# Patient Record
Sex: FEMALE | Race: BLACK OR AFRICAN AMERICAN | URBAN - METROPOLITAN AREA
[De-identification: names, ages, dates, MRNs, and addresses within clinical notes are randomized per-mention and may not be internally consistent; named-entity substitution may affect disease eponyms.]

---

## 2017-07-05 ENCOUNTER — OFFICE VISIT (OUTPATIENT)
Dept: URGENT CARE | Facility: URGENT CARE | Age: 22
End: 2017-07-05
Payer: COMMERCIAL

## 2017-07-05 VITALS
WEIGHT: 189 LBS | TEMPERATURE: 97.4 F | HEART RATE: 60 BPM | DIASTOLIC BLOOD PRESSURE: 76 MMHG | OXYGEN SATURATION: 100 % | SYSTOLIC BLOOD PRESSURE: 110 MMHG

## 2017-07-05 DIAGNOSIS — L03.211 CELLULITIS OF FACE: Primary | ICD-10-CM

## 2017-07-05 PROCEDURE — 99203 OFFICE O/P NEW LOW 30 MIN: CPT | Performed by: NURSE PRACTITIONER

## 2017-07-05 RX ORDER — CEPHALEXIN 500 MG/1
500 CAPSULE ORAL 4 TIMES DAILY
Qty: 40 CAPSULE | Refills: 0 | Status: SHIPPED | OUTPATIENT
Start: 2017-07-05 | End: 2017-07-15

## 2017-07-05 NOTE — LETTER
[facility]  [address]  [facility] [address]  Phone: [phone]    July 5, 2017        Fabiola Tubbs  [address] [facility] [address]          To whom it may concern:    RE: Fabiola Tubbs    Patient was seen and treated today at our clinic for a facial bacterial infection.  Patient may return to work 7/7/2017 with the following no restrictions    Please contact me for questions or concerns.      Sincerely,        Joy Ivy NP

## 2017-07-05 NOTE — PROGRESS NOTES
SUBJECTIVE:                                                    Fabiola Tubbs is a 22 year old female who presents to clinic today for the following health issues:    FACIAL SWELLING      Duration: yesterday had pain and bump near area. Swelling started this morning.     Description (location/character/radiation): right sided facial swelling    Intensity:  mild    Accompanying signs and symptoms:     History (similar episodes/previous evaluation): None    Precipitating or alleviating factors: None    Therapies tried and outcome: hydrogen peroxide- made sx worse.           No Known Allergies    No past medical history on file.      No current outpatient prescriptions on file prior to visit.  No current facility-administered medications on file prior to visit.     Social History   Substance Use Topics     Smoking status: Former Smoker     Smokeless tobacco: Not on file     Alcohol use Not on file       ROS:  General: negative for fever  SKIN: + as above  Physcial Exam:  /76 (BP Location: Left arm, Patient Position: Chair, Cuff Size: Adult Regular)  Pulse 60  Temp 97.4  F (36.3  C) (Oral)  Wt 189 lb (85.7 kg)  SpO2 100%  Breastfeeding? No    GENERAL: alert, no acute distress  EYES: conjunctival clear  RESP: Regular breathing rate  NEURO: awake .  SKIN: Tenderness, erythema and swelling of right cheek, small dotted opening present and draining clear liquid. Skin on the right cheek feels warm to touch.     ASSESSMENT:    ICD-10-CM    1. Cellulitis of face L03.211 cephALEXin (KEFLEX) 500 MG capsule       PLAN:  Bacitracin ointment, advised not to squeeze the swelling, wash hands after touching the wound opening.  See today's orders.  Follow-up with primary clinic if not improving.  Advised about symptoms which might herald more serious problems.    Joy Ivy  [facility]  Family Nurse Practitoner

## 2017-07-05 NOTE — MR AVS SNAPSHOT
After Visit Summary   7/5/2017    Fabiola Tubbs    MRN: [id number]           Patient Information     Date Of Birth          1995        Visit Information        Provider Department      7/5/2017 6:30 PM Joy Ivy NP [facility]        Today's Diagnoses     Cellulitis of face    -  1      Care Instructions      Facial Cellulitis  Cellulitis is an infection of the deep layers of skin. A break in the skin, such as a cut or scratch, can let bacteria under the skin. It may also occur from an infected oil gland (pimple) or hair follicle. If the bacteria get to deep layers of the skin, it can be serious. If not treated, cellulitis can get into the bloodstream and lymph nodes. The infection can then spread throughout the body. This causes serious illness.  Cellulitis causes the affected skin to become red, swollen, warm, and sore. The reddened areas have a visible border. You may have a fever, chills, and pain.  Cellulitis is treated with antibiotics taken for 7 to 10 days. Symptoms should get better 1 to 2 days after treatment is started. Make sure to take all the antibiotics for the full number of days until they are gone. Keep taking the medication even if your symptoms go away.  Home care  Follow these tips:    Take all of the antibiotic medicine exactly as directed until it is gone. Don t miss any doses, especially during the first 7 days. Don t stop taking it when your symptoms get better.    Use a cool compress (face cloth soaked in cool water) on your face to help reduce swelling and pain.    You may use acetaminophen or ibuprofen to reduce pain. Don t use these if you have chronic liver or kidney disease, or ever had a stomach ulcer or gastrointestinal bleeding. Talk with your healthcare provider first.  Follow-up care  Follow up with your healthcare provider, or as advised. If your infection does not go away on the first antibiotic, your healthcare provider will prescribe a  "different one.  When to seek medical advice  Call your healthcare provider right away if any of these occur:    Fever higher of 100.4  F (38.0  C) or higher after 2 days on antibiotics    Red areas that spread    Swelling or pain that gets worse    Fluid leaking from the skin (pus)    An eyelid that swells shut or leaks fluid (pus)    Headache or neck pain that gets worse    Unusual drowsiness or confusion    Convulsions (seizure)    Change in eyesight     Date Last Reviewed: 9/1/2016 2000-2017 [employer]. [address] [place] [address]. All rights reserved. This information is not intended as a substitute for professional medical care. Always follow your healthcare professional's instructions.                Follow-ups after your visit        Who to contact     If you have questions or need follow up information about today's clinic visit or your schedule please contact [facility] directly at [phone].  Normal or non-critical lab and imaging results will be communicated to you by Mist.iohart, letter or phone within 4 business days after the clinic has received the results. If you do not hear from us within 7 days, please contact the clinic through Mist.iohart or phone. If you have a critical or abnormal lab result, we will notify you by phone as soon as possible.  Submit refill requests through Globitel or call your pharmacy and they will forward the refill request to us. Please allow 3 business days for your refill to be completed.          Additional Information About Your Visit        Globitel Information     Globitel lets you send messages to your doctor, view your test results, renew your prescriptions, schedule appointments and more. To sign up, go to [email or web address] . Click on \"Log in\" on the left side of the screen, which will take you to the Welcome page. Then click on \"Sign up Now\" on the right side of the page.     You will be asked to enter the access " code listed below, as well as some personal information. Please follow the directions to create your username and password.     Your access code is: [email or web address]  Expires: 10/3/2017  7:20 PM     Your access code will  in 90 days. If you need help or a new code, please call your [facility] or [phone].        Care EveryWhere ID     This is your Care EveryWhere ID. This could be used by other organizations to access your [facility] medical records  [id number]        Your Vitals Were     Pulse Temperature Pulse Oximetry Breastfeeding?          60 97.4  F (36.3  C) (Oral) 100% No         Blood Pressure from Last 3 Encounters:   17 110/76    Weight from Last 3 Encounters:   17 189 lb (85.7 kg)              Today, you had the following     No orders found for display         Today's Medication Changes          These changes are accurate as of: 17  7:20 PM.  If you have any questions, ask your nurse or doctor.               Start taking these medicines.        Dose/Directions    cephALEXin 500 MG capsule   Commonly known as:  KEFLEX   Used for:  Cellulitis of face   Started by:  Joy Ivy NP        Dose:  500 mg   Take 1 capsule (500 mg) by mouth 4 times daily for 10 days   Quantity:  40 capsule   Refills:  0            Where to get your medicines      These medications were sent to [facility] [address] [place] [address]     Phone:  [phone]     cephALEXin 500 MG capsule                Primary Care Provider    None Specified       No primary provider on file.        Equal Access to Services     [facility] AH: Hadii aad ku hadasho Soradha, waaxda luqadaha, qaybta kaalmada adechikayada, sri wen. So [facility] [phone].    ATENCIÓN: Si habla español, tiene a meyer disposición servicios gratuitos de asistencia lingüística. Llame al [phone].    We comply with applicable federal civil  rights laws and [place] laws. We do not discriminate on the basis of race, color, national origin, age, disability sex, sexual orientation or gender identity.            Thank you!     Thank you for choosing [facility]  for your care. Our goal is always to provide you with excellent care. Hearing back from our patients is one way we can continue to improve our services. Please take a few minutes to complete the written survey that you may receive in the mail after your visit with us. Thank you!             Your Updated Medication List - Protect others around you: Learn how to safely use, store and throw away your medicines at www.disposemymeds.org.          This list is accurate as of: 7/5/17  7:20 PM.  Always use your most recent med list.                   Brand Name Dispense Instructions for use Diagnosis    cephALEXin 500 MG capsule    KEFLEX    40 capsule    Take 1 capsule (500 mg) by mouth 4 times daily for 10 days    Cellulitis of face

## 2017-07-06 NOTE — NURSING NOTE
Chief Complaint   Patient presents with     Facial Swelling     Pt c/o right sided facial swelling.        Initial /76 (BP Location: Left arm, Patient Position: Chair, Cuff Size: Adult Regular)  Pulse 60  Temp 97.4  F (36.3  C) (Oral)  Wt 189 lb (85.7 kg)  SpO2 100%  Breastfeeding? No There is no height or weight on file to calculate BMI.  Medication Reconciliation: complete       Sherry Hinson CMA (AAMA)

## 2017-07-06 NOTE — PATIENT INSTRUCTIONS
Facial Cellulitis  Cellulitis is an infection of the deep layers of skin. A break in the skin, such as a cut or scratch, can let bacteria under the skin. It may also occur from an infected oil gland (pimple) or hair follicle. If the bacteria get to deep layers of the skin, it can be serious. If not treated, cellulitis can get into the bloodstream and lymph nodes. The infection can then spread throughout the body. This causes serious illness.  Cellulitis causes the affected skin to become red, swollen, warm, and sore. The reddened areas have a visible border. You may have a fever, chills, and pain.  Cellulitis is treated with antibiotics taken for 7 to 10 days. Symptoms should get better 1 to 2 days after treatment is started. Make sure to take all the antibiotics for the full number of days until they are gone. Keep taking the medication even if your symptoms go away.  Home care  Follow these tips:    Take all of the antibiotic medicine exactly as directed until it is gone. Don t miss any doses, especially during the first 7 days. Don t stop taking it when your symptoms get better.    Use a cool compress (face cloth soaked in cool water) on your face to help reduce swelling and pain.    You may use acetaminophen or ibuprofen to reduce pain. Don t use these if you have chronic liver or kidney disease, or ever had a stomach ulcer or gastrointestinal bleeding. Talk with your healthcare provider first.  Follow-up care  Follow up with your healthcare provider, or as advised. If your infection does not go away on the first antibiotic, your healthcare provider will prescribe a different one.  When to seek medical advice  Call your healthcare provider right away if any of these occur:    Fever higher of 100.4  F (38.0  C) or higher after 2 days on antibiotics    Red areas that spread    Swelling or pain that gets worse    Fluid leaking from the skin (pus)    An eyelid that swells shut or leaks fluid (pus)    Headache or  neck pain that gets worse    Unusual drowsiness or confusion    Convulsions (seizure)    Change in eyesight     Date Last Reviewed: 9/1/2016 2000-2017 [employer] [address] [place] [address], [place] [address]. All rights reserved. This information is not intended as a substitute for professional medical care. Always follow your healthcare professional's instructions.

## 2017-08-09 ENCOUNTER — OFFICE VISIT (OUTPATIENT)
Dept: URGENT CARE | Facility: URGENT CARE | Age: 22
End: 2017-08-09
Payer: COMMERCIAL

## 2017-08-09 VITALS
TEMPERATURE: 97.3 F | SYSTOLIC BLOOD PRESSURE: 112 MMHG | WEIGHT: 191 LBS | OXYGEN SATURATION: 97 % | HEART RATE: 84 BPM | DIASTOLIC BLOOD PRESSURE: 77 MMHG

## 2017-08-09 DIAGNOSIS — H60.331 ACUTE SWIMMER'S EAR OF RIGHT SIDE: Primary | ICD-10-CM

## 2017-08-09 PROCEDURE — 99213 OFFICE O/P EST LOW 20 MIN: CPT | Performed by: NURSE PRACTITIONER

## 2017-08-09 RX ORDER — CIPROFLOXACIN AND DEXAMETHASONE 3; 1 MG/ML; MG/ML
4 SUSPENSION/ DROPS AURICULAR (OTIC) 2 TIMES DAILY
Qty: 2.8 ML | Refills: 0 | Status: SHIPPED | OUTPATIENT
Start: 2017-08-09 | End: 2017-08-16

## 2017-08-09 NOTE — MR AVS SNAPSHOT
After Visit Summary   8/9/2017    Fabiola Tubbs    MRN: [id number]           Patient Information     Date Of Birth          1995        Visit Information        Provider Department      8/9/2017 4:10 PM Joy Ivy NP [facility]        Today's Diagnoses     Acute swimmer's ear of right side    -  1      Care Instructions      External Ear Infection (Adult)    External otitis (also called  swimmer s ear ) is an infection in the ear canal. It is often caused by bacteria or fungus. It can occur a few days after water gets trapped in the ear canal (from swimming or bathing). It can also occur after cleaning too deeply in the ear canal with a cotton swab or other object. Sometimes, hair care products get into the ear canal and cause this problem.  Symptoms can include pain, fever, itching, redness, drainage, or swelling of the ear canal. Temporary hearing loss may also occur.  Home care    Do not try to clean the ear canal. This can push pus and bacteria deeper into the canal.    Use prescribed ear drops as directed. These help reduce swelling and fight the infection. If an ear wick was placed in the ear canal, apply drops right onto the end of the wick. The wick will draw the medication into the ear canal even if it is swollen closed.    A cotton ball may be loosely placed in the outer ear to absorb any drainage.    You may use acetaminophen or ibuprofen to control pain, unless another medication was prescribed. Note: If you have chronic liver or kidney disease or ever had a stomach ulcer or GI bleeding, talk to your health care provider before taking any of these medications.    Do not allow water to get into your ear when bathing. Also, avoid swimming until the infection has cleared.  Prevention    Keep your ears dry. This helps lower the risk of infection. Dry your ears with a towel or hair dryer after getting wet. Also, use ear plugs when swimming.    Do not stick any objects in  the ear to remove wax.    If you feel water trapped in your ear, use ear drops right away. You can get these drops over the counter at most drugstores. They work by removing water from the ear canal.  Follow-up care  Follow up with your health care provider in one week, or as advised.  When to seek medical advice  Call your health care provider right away if any of these occur:    Ear pain becomes worse or doesn t improve after 3 days of treatment    Redness or swelling of the outer ear occurs or gets worse    Headache    Painful or stiff neck    Drowsiness or confusion    Fever of 100.4 F (38 C) or higher, or as directed by your health care provider    Seizure  Date Last Reviewed: 3/22/2015    [id number] [employer]. [address] [place] [address], [place] [address]. All rights reserved. This information is not intended as a substitute for professional medical care. Always follow your healthcare professional's instructions.                Follow-ups after your visit        Who to contact     If you have questions or need follow up information about today's clinic visit or your schedule please contact [facility] directly at [phone].  Normal or non-critical lab and imaging results will be communicated to you by MyChart, letter or phone within 4 business days after the clinic has received the results. If you do not hear from us within 7 days, please contact the clinic through Storybirdhart or phone. If you have a critical or abnormal lab result, we will notify you by phone as soon as possible.  Submit refill requests through [employer] or call your pharmacy and they will forward the refill request to us. Please allow 3 business days for your refill to be completed.          Additional Information About Your Visit        StorybirdharLevels Beyond Information     [employer] lets you send messages to your doctor, view your test results, renew your prescriptions, schedule appointments and more. To sign up, go to  [email or web address] . Click on \"Log in\" on the left side of the screen, which will take you to the Welcome page. Then click on \"Sign up Now\" on the right side of the page.     You will be asked to enter the access code listed below, as well as some personal information. Please follow the directions to create your username and password.     Your access code is: [email or web address]  Expires: 10/3/2017  7:20 PM     Your access code will  in 90 days. If you need help or a new code, please call your [facility] clinic or [phone].        Care EveryWhere ID     This is your Care EveryWhere ID. This could be used by other organizations to access your [facility] medical records  [id number]        Your Vitals Were     Pulse Temperature Pulse Oximetry             84 97.3  F (36.3  C) (Oral) 97%          Blood Pressure from Last 3 Encounters:   17 112/77   17 110/76    Weight from Last 3 Encounters:   17 191 lb (86.6 kg)   17 189 lb (85.7 kg)              Today, you had the following     No orders found for display         Today's Medication Changes          These changes are accurate as of: 17  5:22 PM.  If you have any questions, ask your nurse or doctor.               Start taking these medicines.        Dose/Directions    ciprofloxacin-dexamethasone otic suspension   Commonly known as:  CIPRODEX   Used for:  Acute swimmer's ear of right side   Started by:  Joy Ivy NP        Dose:  4 drop   Place 4 drops into the right ear 2 times daily for 7 days   Quantity:  2.8 mL   Refills:  0            Where to get your medicines      These medications were sent to [facility] [address] [place] [address]     Phone:  [phone]     ciprofloxacin-dexamethasone otic suspension                Primary Care Provider    None Specified       No primary provider on file.        Equal Access to Services     [facility] AH: Kenneth dalton " daniela Cisse, ivy luheribertoadaha, qabryonta kaveronica gennaanita, sri brandiin hayaaazul vailchika shreyasjonsusan mendozaWillianmicah bettye. So [facility] [phone].    ATENCIÓN: Si habla español, tiene a meyer disposición servicios gratuitos de asistencia lingüística. Natalya al [phone].    We comply with applicable federal civil rights laws and [place] laws. We do not discriminate on the basis of race, color, national origin, age, disability sex, sexual orientation or gender identity.            Thank you!     Thank you for choosing [facility]  for your care. Our goal is always to provide you with excellent care. Hearing back from our patients is one way we can continue to improve our services. Please take a few minutes to complete the written survey that you may receive in the mail after your visit with us. Thank you!             Your Updated Medication List - Protect others around you: Learn how to safely use, store and throw away your medicines at www.disposemymeds.org.          This list is accurate as of: 8/9/17  5:22 PM.  Always use your most recent med list.                   Brand Name Dispense Instructions for use Diagnosis    ciprofloxacin-dexamethasone otic suspension    CIPRODEX    2.8 mL    Place 4 drops into the right ear 2 times daily for 7 days    Acute swimmer's ear of right side

## 2017-08-09 NOTE — PROGRESS NOTES
SUBJECTIVE:                                                    Fabiola Tubbs is a 22 year old female who presents to clinic today for the following health issues:      Ear Pain      Duration: 2 days    Description (location/character/radiation): right ear    Intensity:  moderate    Accompanying signs and symptoms: ear pain    History (similar episodes/previous evaluation): None    Precipitating or alleviating factors: None    Therapies tried and outcome: None        No Known Allergies    No past medical history on file.      No current outpatient prescriptions on file prior to visit.  No current facility-administered medications on file prior to visit.     Social History   Substance Use Topics     Smoking status: Never Smoker     Smokeless tobacco: Never Used     Alcohol use Not on file       ROS:   Constitutional: no fevers  ENT: as above    OBJECTIVE:  /77 (BP Location: Left arm, Patient Position: Chair, Cuff Size: Adult Regular)  Pulse 84  Temp 97.3  F (36.3  C) (Oral)  Wt 191 lb (86.6 kg)  SpO2 97%   General:   awake, alert, and cooperative.  NAD.   Head: Normocephalic, atraumatic.  Eyes: Conjunctiva clear,   ENT: . RT Canal is tender and is erythematous.  Both TMs are grey and translucent, left ear canal is intact  Neuro: Alert and oriented - normal speech.    ASSESSMENT:    ICD-10-CM    1. Acute swimmer's ear of right side H60.331 ciprofloxacin-dexamethasone (CIPRODEX) otic suspension          PLAN:     Antibiotics ear drops  as prescribed.  Recheck ear in 2 weeks.  Patient educational/instructional material provided including reasons for follow-up    The patient indicates understanding of these issues and agrees with the plan.    Joy Ivy  [facility]  Family Nurse Practitoner

## 2017-08-09 NOTE — LETTER
[facility]  [address]  [facility] [address]  Phone: [phone]    August 9, 2017        Fabiola Tubbs  [address] [facility] [address]          To whom it may concern:    RE: Fabiola Tubbs    Patient was seen and treated today at our clinic and missed work.  Patient may return to work 8/10/2017 with No restrictions.    Please contact me for questions or concerns.      Sincerely,        Joy Ivy NP

## 2017-08-09 NOTE — PATIENT INSTRUCTIONS
External Ear Infection (Adult)    External otitis (also called  swimmer s ear ) is an infection in the ear canal. It is often caused by bacteria or fungus. It can occur a few days after water gets trapped in the ear canal (from swimming or bathing). It can also occur after cleaning too deeply in the ear canal with a cotton swab or other object. Sometimes, hair care products get into the ear canal and cause this problem.  Symptoms can include pain, fever, itching, redness, drainage, or swelling of the ear canal. Temporary hearing loss may also occur.  Home care    Do not try to clean the ear canal. This can push pus and bacteria deeper into the canal.    Use prescribed ear drops as directed. These help reduce swelling and fight the infection. If an ear wick was placed in the ear canal, apply drops right onto the end of the wick. The wick will draw the medication into the ear canal even if it is swollen closed.    A cotton ball may be loosely placed in the outer ear to absorb any drainage.    You may use acetaminophen or ibuprofen to control pain, unless another medication was prescribed. Note: If you have chronic liver or kidney disease or ever had a stomach ulcer or GI bleeding, talk to your health care provider before taking any of these medications.    Do not allow water to get into your ear when bathing. Also, avoid swimming until the infection has cleared.  Prevention    Keep your ears dry. This helps lower the risk of infection. Dry your ears with a towel or hair dryer after getting wet. Also, use ear plugs when swimming.    Do not stick any objects in the ear to remove wax.    If you feel water trapped in your ear, use ear drops right away. You can get these drops over the counter at most drugstores. They work by removing water from the ear canal.  Follow-up care  Follow up with your health care provider in one week, or as advised.  When to seek medical advice  Call your health care provider right away if  any of these occur:    Ear pain becomes worse or doesn t improve after 3 days of treatment    Redness or swelling of the outer ear occurs or gets worse    Headache    Painful or stiff neck    Drowsiness or confusion    Fever of 100.4 F (38 C) or higher, or as directed by your health care provider    Seizure  Date Last Reviewed: 3/22/2015    2941-7424 [employer]. [address] [place] [address]. All rights reserved. This information is not intended as a substitute for professional medical care. Always follow your healthcare professional's instructions.

## 2017-08-09 NOTE — NURSING NOTE
Chief Complaint   Patient presents with     Otalgia     right ear pain       Initial /77 (BP Location: Left arm, Patient Position: Chair, Cuff Size: Adult Regular)  Pulse 84  Temp 97.3  F (36.3  C) (Oral)  Wt 191 lb (86.6 kg)  SpO2 97% There is no height or weight on file to calculate BMI.  Medication Reconciliation: complete         Rupinder Wooten

## 2017-09-23 ENCOUNTER — OFFICE VISIT (OUTPATIENT)
Dept: URGENT CARE | Facility: URGENT CARE | Age: 22
End: 2017-09-23
Payer: COMMERCIAL

## 2017-09-23 VITALS
WEIGHT: 198 LBS | SYSTOLIC BLOOD PRESSURE: 120 MMHG | OXYGEN SATURATION: 97 % | TEMPERATURE: 98.5 F | DIASTOLIC BLOOD PRESSURE: 71 MMHG | HEART RATE: 98 BPM

## 2017-09-23 DIAGNOSIS — H60.501 ACUTE OTITIS EXTERNA OF RIGHT EAR, UNSPECIFIED TYPE: Primary | ICD-10-CM

## 2017-09-23 PROCEDURE — 99213 OFFICE O/P EST LOW 20 MIN: CPT | Performed by: PHYSICIAN ASSISTANT

## 2017-09-23 RX ORDER — NEOMYCIN SULFATE, POLYMYXIN B SULFATE AND HYDROCORTISONE 10; 3.5; 1 MG/ML; MG/ML; [USP'U]/ML
3 SUSPENSION/ DROPS AURICULAR (OTIC) 4 TIMES DAILY
Qty: 5 ML | Refills: 0 | Status: SHIPPED | OUTPATIENT
Start: 2017-09-23 | End: 2017-09-30

## 2017-09-23 NOTE — PROGRESS NOTES
SUBJECTIVE:   Fabiola Tubbs is a 22 year old female who presents to clinic today for the following health issues:      Ear pain      Duration: since Thursday    Description (location/character/radiation): right ear    Intensity:  severe    Accompanying signs and symptoms: headache but went away    History (similar episodes/previous evaluation): hx of ear infections    Precipitating or alleviating factors: None    Therapies tried and outcome: None but sleep     This started at work on Thursday  Her right ear started hurting and felt moist on the inside  Then became painful  Was sent home from work  She then had a bad brief headache on Thursday night  No runny nose, fever, cough, sore throat, respiratory symptoms.       Problem list and histories reviewed & adjusted, as indicated.  Additional history: as documented    There is no problem list on file for this patient.    No past surgical history on file.    Social History   Substance Use Topics     Smoking status: Never Smoker     Smokeless tobacco: Never Used     Alcohol use Not on file     No family history on file.      No current outpatient prescriptions on file.     No Known Allergies      Reviewed and updated as needed this visit by clinical staff       Reviewed and updated as needed this visit by Provider         ROS:  As in HPI      OBJECTIVE:     /71 (BP Location: Left arm, Patient Position: Chair, Cuff Size: Adult Regular)  Pulse 98  Temp 98.5  F (36.9  C) (Oral)  Wt 198 lb (89.8 kg)  SpO2 97%  There is no height or weight on file to calculate BMI.  GENERAL: healthy, alert and no distress  EYES: Eyes grossly normal to inspection, PERRL and conjunctivae and sclerae normal  HENT: normal cephalic/atraumatic, right ear: red and boggy canal and serous, thick drainage and left ear: normal: no effusions, no erythema, normal landmarks  MS: no gross musculoskeletal defects noted, no edema    Diagnostic Test Results:  none     ASSESSMENT/PLAN:     1. Acute  otitis externa of right ear, unspecified type  - neomycin-polymyxin-hydrocortisone (CORTISPORIN) 3.5-95305-5 otic suspension; Place 3 drops into the right ear 4 times daily for 7 days  Dispense: 5 mL; Refill: 0      Theresa Peters PA-C  [facility]

## 2017-09-23 NOTE — NURSING NOTE
Chief Complaint   Patient presents with     Otalgia       Initial /71 (BP Location: Left arm, Patient Position: Chair, Cuff Size: Adult Regular)  Pulse 98  Temp 98.5  F (36.9  C) (Oral)  Wt 198 lb (89.8 kg)  SpO2 97% There is no height or weight on file to calculate BMI.  Medication Reconciliation: complete     Luz Maria Guajardo, CMA

## 2017-09-23 NOTE — MR AVS SNAPSHOT
"              After Visit Summary   2017    Fabiola Tubbs    MRN: [id number]           Patient Information     Date Of Birth          1995        Visit Information        Provider Department      2017 12:50 PM Theresa Peters PA-C [facility]        Today's Diagnoses     Acute otitis externa of right ear, unspecified type    -  1       Follow-ups after your visit        Who to contact     If you have questions or need follow up information about today's clinic visit or your schedule please contact [facility] directly at [phone].  Normal or non-critical lab and imaging results will be communicated to you by [employer], letter or phone within 4 business days after the clinic has received the results. If you do not hear from us within 7 days, please contact the clinic through [employer] or phone. If you have a critical or abnormal lab result, we will notify you by phone as soon as possible.  Submit refill requests through [employer] or call your pharmacy and they will forward the refill request to us. Please allow 3 business days for your refill to be completed.          Additional Information About Your Visit        MyChart Information     [employer] lets you send messages to your doctor, view your test results, renew your prescriptions, schedule appointments and more. To sign up, go to [email or web address] . Click on \"Log in\" on the left side of the screen, which will take you to the Welcome page. Then click on \"Sign up Now\" on the right side of the page.     You will be asked to enter the access code listed below, as well as some personal information. Please follow the directions to create your username and password.     Your access code is: [email or web address]  Expires: 10/3/2017  7:20 PM     Your access code will  in 90 days. If you need help or a new code, please call your [facility] or [phone].        Care EveryWhere ID     This is your Care " EveryWhere ID. This could be used by other organizations to access your [facility] medical records  [id number]        Your Vitals Were     Pulse Temperature Pulse Oximetry             98 98.5  F (36.9  C) (Oral) 97%          Blood Pressure from Last 3 Encounters:   09/23/17 120/71   08/09/17 112/77   07/05/17 110/76    Weight from Last 3 Encounters:   09/23/17 198 lb (89.8 kg)   08/09/17 191 lb (86.6 kg)   07/05/17 189 lb (85.7 kg)              Today, you had the following     No orders found for display         Today's Medication Changes          These changes are accurate as of: 9/23/17  1:22 PM.  If you have any questions, ask your nurse or doctor.               Start taking these medicines.        Dose/Directions    neomycin-polymyxin-hydrocortisone 3.5-27423-8 otic suspension   Commonly known as:  CORTISPORIN   Used for:  Acute otitis externa of right ear, unspecified type   Started by:  Theresa Peters PA-C        Dose:  3 drop   Place 3 drops into the right ear 4 times daily for 7 days   Quantity:  5 mL   Refills:  0            Where to get your medicines      These medications were sent to [facility] [address] [place], [place] [id number]     Phone:  [phone]     neomycin-polymyxin-hydrocortisone 3.5-23969-1 otic suspension                Primary Care Provider    None Specified       No primary provider on file.        Equal Access to Services     ESTRELLA MCKEON : Kenneth Cisse, warabiada lutroy, [address]. [facility] [phone].    ATENCIÓN: Si habla español, tiene a meyer disposición servicios gratuitos de asistencia lingüística. Llame al [phone].    We comply with applicable federal civil rights laws and [place] laws. We do not discriminate on the basis of race, color, national origin, age, disability sex, sexual orientation or gender identity.             Thank you!     Thank you for choosing [facility]  for your care. Our goal is always to provide you with excellent care. Hearing back from our patients is one way we can continue to improve our services. Please take a few minutes to complete the written survey that you may receive in the mail after your visit with us. Thank you!             Your Updated Medication List - Protect others around you: Learn how to safely use, store and throw away your medicines at www.disposemymeds.org.          This list is accurate as of: 9/23/17  1:22 PM.  Always use your most recent med list.                   Brand Name Dispense Instructions for use Diagnosis    neomycin-polymyxin-hydrocortisone 3.5-09658-0 otic suspension    CORTISPORIN    5 mL    Place 3 drops into the right ear 4 times daily for 7 days    Acute otitis externa of right ear, unspecified type

## 2017-09-23 NOTE — LETTER
[facility]  [address]  [facility] [address]  Phone: [phone]    September 23, 2017        Fabiola Tubbs  [address]  [facility] [address]          To whom it may concern:    RE: Fabiola Tubbs    Patient was seen and treated today at our clinic and missed work.  Please excuse her from work today and tomorrow.     Please contact me for questions or concerns.      Sincerely,        Theresa Peters PA-C

## 2018-05-07 ENCOUNTER — APPOINTMENT (OUTPATIENT)
Dept: GENERAL RADIOLOGY | Facility: CLINIC | Age: 23
End: 2018-05-07
Attending: EMERGENCY MEDICINE
Payer: COMMERCIAL

## 2018-05-07 ENCOUNTER — HOSPITAL ENCOUNTER (EMERGENCY)
Facility: CLINIC | Age: 23
Discharge: HOME | End: 2018-05-07
Attending: EMERGENCY MEDICINE | Admitting: EMERGENCY MEDICINE
Payer: COMMERCIAL

## 2018-05-07 VITALS
HEART RATE: 76 BPM | DIASTOLIC BLOOD PRESSURE: 83 MMHG | WEIGHT: 203.8 LBS | OXYGEN SATURATION: 100 % | SYSTOLIC BLOOD PRESSURE: 134 MMHG | RESPIRATION RATE: 16 BRPM | TEMPERATURE: 98.9 F

## 2018-05-07 DIAGNOSIS — S90.31XA CONTUSION OF RIGHT FOOT, INITIAL ENCOUNTER: ICD-10-CM

## 2018-05-07 PROCEDURE — 99283 EMERGENCY DEPT VISIT LOW MDM: CPT | Mod: Z6 | Performed by: EMERGENCY MEDICINE

## 2018-05-07 PROCEDURE — 73630 X-RAY EXAM OF FOOT: CPT | Mod: RT

## 2018-05-07 PROCEDURE — 99283 EMERGENCY DEPT VISIT LOW MDM: CPT | Performed by: EMERGENCY MEDICINE

## 2018-05-07 PROCEDURE — 25000132 ZZH RX MED GY IP 250 OP 250 PS 637: Performed by: EMERGENCY MEDICINE

## 2018-05-07 RX ORDER — IBUPROFEN 800 MG/1
800 TABLET, FILM COATED ORAL EVERY 8 HOURS PRN
Qty: 9 TABLET | Refills: 0 | Status: SHIPPED | OUTPATIENT
Start: 2018-05-07 | End: 2018-05-10

## 2018-05-07 RX ORDER — IBUPROFEN 600 MG/1
600 TABLET, FILM COATED ORAL ONCE
Status: COMPLETED | OUTPATIENT
Start: 2018-05-07 | End: 2018-05-07

## 2018-05-07 RX ADMIN — IBUPROFEN 600 MG: 600 TABLET ORAL at 10:58

## 2018-05-07 NOTE — ED PROVIDER NOTES
History     Chief Complaint   Patient presents with     Toe Pain     dropped a heater on big toe of right foot this morning.      PACO Tubsb is a 23 year old female who presents to the Emergency Department for right toe pain. The patient reports helping her grandma move, and when she was moving a heater she accidentally dropped It on her right big toe. She also reports right first metatarsal pain.     I have reviewed the Medications, Allergies, Past Medical and Surgical History, and Social History in the [facility] system.  History reviewed. No pertinent past medical history.    History reviewed. No pertinent surgical history.    No family history on file.    Social History   Substance Use Topics     Smoking status: Never Smoker     Smokeless tobacco: Never Used     Alcohol use No       No current facility-administered medications for this encounter.      Current Outpatient Prescriptions   Medication     ibuprofen (ADVIL/MOTRIN) 800 MG tablet      No Known Allergies    Review of Systems   Musculoskeletal:        Right toe/first metatarsal pain       Physical Exam   BP: 134/83  Pulse: 76  Temp: 98.9  F (37.2  C)  Resp: 16  Weight: 92.4 kg (203 lb 12.8 oz)  SpO2: 100 %      Physical Exam   Constitutional: She is oriented to person, place, and time. She appears well-developed and well-nourished. No distress.   HENT:   Head: Normocephalic and atraumatic.   Right Ear: External ear normal.   Left Ear: External ear normal.   Eyes: EOM are normal.   Cardiovascular: Normal rate.    Pulmonary/Chest: Effort normal.   Musculoskeletal:   Tender over right great toe and midfoot.  Pulse intact. Minimal swelling. No bruising. No deformity.    Neurological: She is alert and oriented to person, place, and time.   Skin: Skin is warm and dry. No rash noted. She is not diaphoretic. No erythema. No pallor.   Nursing note reviewed.      ED Course     ED Course     Procedures           Labs Ordered and Resulted from Time of ED Arrival Up  to the Time of Departure from the ED - No data to display  Results for orders placed or performed during the hospital encounter of 05/07/18 (from the past 24 hour(s))   Foot  XR, G/E 3 views, right    Narrative    XR FOOT RT G/E 3 VW 5/7/2018 11:26 AM    COMPARISON: None.    HISTORY: Pain after trauma to the great toe and midfoot.      Impression    IMPRESSION: No fractures are seen in the right foot. Joints are  preserved and in normal alignment.    JOANN HARRY MD            Assessments & Plan (with Medical Decision Making)   The patient presents after dropping a heavy item on her right foot.  Pulses intact and no deformity noted. Xray shows no fracture.  She was given ibuprofen, ace wrap and crutches for  Comfort.  PMD f/u if no better within 1 week.     The patient says she doesn't want crutches and would really like a cam boot.  She was given a post op shoe and really liked it.     I have reviewed the nursing notes.    I have reviewed the findings, diagnosis, plan and need for follow up with the patient.    New Prescriptions    IBUPROFEN (ADVIL/MOTRIN) 800 MG TABLET    Take 1 tablet (800 mg) by mouth every 8 hours as needed for moderate pain       Final diagnoses:   Contusion of right foot, initial encounter   I, Karen Nelson, am serving as a trained medical scribe to document services personally performed by Florence Richardson MD, based on the provider's statements to me.   I, Florence Richardson MD, was physically present and have reviewed and verified the accuracy of this note documented by Karen Nelson.      5/7/2018   [facility], [place], EMERGENCY DEPARTMENT     Florence Richardson MD  05/07/18 1246       Florence Richardson MD  05/07/18 1308

## 2018-05-07 NOTE — ED AVS SNAPSHOT
[facility], [place], Emergency Department    [address]    [facility] [address]    Phone:  [phone]    Fax:  [phone]                                       Fabiola Tubbs   MRN: [id number]    Department:  [facility], Emergency Department   Date of Visit:  5/7/2018           After Visit Summary Signature Page     I have received my discharge instructions, and my questions have been answered. I have discussed any challenges I see with this plan with the nurse or doctor.    ..........................................................................................................................................  Patient/Patient Representative Signature      ..........................................................................................................................................  Patient Representative Print Name and Relationship to Patient    ..................................................               ................................................  Date                                            Time    ..........................................................................................................................................  Reviewed by Signature/Title    ...................................................              ..............................................  Date                                                            Time

## 2018-05-07 NOTE — DISCHARGE INSTRUCTIONS
Use crutches for comfort.  Put weight on your right foot as able.      You can take ibuprofen for pain.      Please follow up with your primary care doctor if no better after 1 week.         Foot Contusion  You have a contusion. This is also called a bruise. There is swelling and some bleeding under the skin, but no broken bones. This injury generally takes a few days to a few weeks to heal.  During that time, the bruise will typically change in color from reddish, to purple-blue, to greenish-yellow, then to yellow-brown.  Home care    Elevate the foot to reduce pain and swelling. As much as possible, sit or lie down with the foot raised about the level of your heart. This is especially important during the first 48 hours.    Ice the foot to help reduce pain and swelling. Wrap a cold source (ice pack or ice cubes in a plastic bag) in a thin towel. Apply to the bruised area for 20 minutes every 1 to 2 hours the first day. Continue this 3 to 4 times a day until the pain and swelling goes away.    Unless another medicine was prescribed, you can take acetaminophen, ibuprofen, or naproxen to control pain. (If you have chronic liver or kidney disease or ever had a stomach ulcer or gastrointestinal bleeding, talk with your healthcare provider before using these medicines.)  Follow up  Follow up with your healthcare provider or our staff as advised. Call if you are not improving within 1 to 2 weeks.  When to seek medical advice   Call your healthcare provider right away if you have any of the following:    Increased pain or swelling    Foot or leg becomes cold, blue, numb or tingly    Signs of infection: Warmth, drainage, or increased redness or pain around the bruise    Inability to move the injured foot     Frequent bruising for unknown reasons  Date Last Reviewed: 2/1/2017 2000-2017 [employer]. [address] [place] [address], [place] [address]. All rights reserved. This information is not intended as a  substitute for professional medical care. Always follow your healthcare professional's instructions.

## 2018-05-07 NOTE — ED AVS SNAPSHOT
[facility], Emergency Department    [address]    [place] [address]    Phone:  [phone]    Fax:  [phone]                                       Fabiola Tubbs   MRN: [id number]    Department:  [facility], Emergency Department   Date of Visit:  5/7/2018           Patient Information     Date Of Birth          1995        Your diagnoses for this visit were:     Contusion of right foot, initial encounter        You were seen by Florence Richardson MD.        Discharge Instructions       Use crutches for comfort.  Put weight on your right foot as able.      You can take ibuprofen for pain.      Please follow up with your primary care doctor if no better after 1 week.         Foot Contusion  You have a contusion. This is also called a bruise. There is swelling and some bleeding under the skin, but no broken bones. This injury generally takes a few days to a few weeks to heal.  During that time, the bruise will typically change in color from reddish, to purple-blue, to greenish-yellow, then to yellow-brown.  Home care    Elevate the foot to reduce pain and swelling. As much as possible, sit or lie down with the foot raised about the level of your heart. This is especially important during the first 48 hours.    Ice the foot to help reduce pain and swelling. Wrap a cold source (ice pack or ice cubes in a plastic bag) in a thin towel. Apply to the bruised area for 20 minutes every 1 to 2 hours the first day. Continue this 3 to 4 times a day until the pain and swelling goes away.    Unless another medicine was prescribed, you can take acetaminophen, ibuprofen, or naproxen to control pain. (If you have chronic liver or kidney disease or ever had a stomach ulcer or gastrointestinal bleeding, talk with your healthcare provider before using these medicines.)  Follow up  Follow up with your healthcare provider or our staff as advised. Call if you are not improving within 1 to 2 weeks.  When to seek medical  advice   Call your healthcare provider right away if you have any of the following:    Increased pain or swelling    Foot or leg becomes cold, blue, numb or tingly    Signs of infection: Warmth, drainage, or increased redness or pain around the bruise    Inability to move the injured foot     Frequent bruising for unknown reasons  Date Last Reviewed: 2/1/2017 2000-2017 [employer]. [address] [place] [address]. All rights reserved. This information is not intended as a substitute for professional medical care. Always follow your healthcare professional's instructions.          24 Hour Appointment Hotline       To make an appointment at any [facility], call [phone] ([phone]). If you don't have a family doctor or clinic, we will help you find one. [facility] are conveniently located to serve the needs of you and your family.          ED Discharge Orders     Crutches       Use gait belt during crutch training.                     Review of your medicines      START taking        Dose / Directions Last dose taken    ibuprofen 800 MG tablet   Commonly known as:  ADVIL/MOTRIN   Dose:  800 mg   Quantity:  9 tablet        Take 1 tablet (800 mg) by mouth every 8 hours as needed for moderate pain   Refills:  0                Prescriptions were sent or printed at these locations (1 Prescription)                   Other Prescriptions                Printed at Department/Unit printer (1 of 1)         ibuprofen (ADVIL/MOTRIN) 800 MG tablet                Procedures and tests performed during your visit     Foot  XR, G/E 3 views, right      Orders Needing Specimen Collection     None      Pending Results     No orders found from 5/5/2018 to 5/8/2018.            Pending Culture Results     No orders found from 5/5/2018 to 5/8/2018.            Pending Results Instructions     If you had any lab results that were not finalized at the time of your Discharge, you can call the ED  "Lab Result RN at [phone]. You will be contacted by this team for any positive Lab results or changes in treatment. The nurses are available 7 days a week from 10A to 6:30P.  You can leave a message 24 hours per day and they will return your call.        Thank you for choosing [facility]       Thank you for choosing [facility] for your care. Our goal is always to provide you with excellent care. Hearing back from our patients is one way we can continue to improve our services. Please take a few minutes to complete the written survey that you may receive in the mail after you visit with us. Thank you!        [employer] Information     [employer] lets you send messages to your doctor, view your test results, renew your prescriptions, schedule appointments and more. To sign up, go to [email or web address] . Click on \"Log in\" on the left side of the screen, which will take you to the Welcome page. Then click on \"Sign up Now\" on the right side of the page.     You will be asked to enter the access code listed below, as well as some personal information. Please follow the directions to create your username and password.     Your access code is: [email or web address]  Expires: 2018 12:46 PM     Your access code will  in 90 days. If you need help or a new code, please call your [facility] clinic or [phone].        Care EveryWhere ID     This is your Care EveryWhere ID. This could be used by other organizations to access your [facility] medical records  [id number]        Equal Access to Services     NYLA MCKEON : Hadii wendi dalton hadasho Soomaali, waaxda luqadaha, qaybta kaalmada adeegyada, sri vinson . So [facility] [place] [phone].    ATENCIÓN: Si habla español, tiene a meyer disposición servicios gratuitos de asistencia lingüística. Llame al [phone].    We comply with applicable federal civil rights laws and [place] laws. We do not discriminate on the basis of race, color, national origin, age, " disability, sex, sexual orientation, or gender identity.            After Visit Summary       This is your record. Keep this with you and show to your community pharmacist(s) and doctor(s) at your next visit.

## 2018-05-07 NOTE — LETTER
[facility], [place], EMERGENCY DEPARTMENT  [address]  [facility] [address]  [phone]      May 7, 2018    Fabiola Tubbs  [address] [facility] [id number] [phone] (home)     : 1995      To Whom it may concern:    Fabiola Tubbs was seen in our Emergency Department today, May 7, 2018.  Please excuse her from work today.     Sincerely,    Florence Richardson MD

## 2020-10-14 ENCOUNTER — VIRTUAL VISIT (OUTPATIENT)
Dept: FAMILY MEDICINE | Facility: CLINIC | Age: 25
End: 2020-10-14
Payer: COMMERCIAL

## 2020-10-14 DIAGNOSIS — R06.2 WHEEZING: ICD-10-CM

## 2020-10-14 DIAGNOSIS — Z82.5 FAMILY HISTORY OF ASTHMA: ICD-10-CM

## 2020-10-14 DIAGNOSIS — R07.89 CHEST TIGHTNESS: Primary | ICD-10-CM

## 2020-10-14 PROCEDURE — 99204 OFFICE O/P NEW MOD 45 MIN: CPT | Mod: TEL | Performed by: NURSE PRACTITIONER

## 2020-10-14 RX ORDER — ALBUTEROL SULFATE 90 UG/1
1-2 AEROSOL, METERED RESPIRATORY (INHALATION) EVERY 4 HOURS PRN
Qty: 1 INHALER | Refills: 1 | Status: SHIPPED | OUTPATIENT
Start: 2020-10-14

## 2020-10-14 NOTE — Clinical Note
Are you able to send todays letter to pts requested email address: [email or web address]?

## 2020-10-14 NOTE — PATIENT INSTRUCTIONS
"Patient Education     Asthma (Adult)  Asthma is a disease where the medium and  small air passages within the lung go into spasm and restrict the flow of air. Inflammation and swelling of the airways cause further blockage. During an acute asthma attack, these factors cause trouble breathing, wheezing, cough and chest tightness.    An asthma attack can be triggered by many things. Common triggers include infections such as the common cold, bronchitis, and pneumonia. Irritants such as smoke or pollutants in the air, very cold air, emotional upset, and exercise can also trigger an attack. In many adults with asthma, allergies to dust, mold, pollen and animal dander can cause an asthma attack. Skipping doses of daily asthma medicine can also bring on an asthma attack.  Asthma can be controlled using the proper medicines prescribed by your healthcare provider and avoiding exposure to known triggers including allergens and irritants.  Home care    Take prescribed medicine exactly at the times advised. If you need medicine such as from a hand held inhaler or aerosol breathing machine more than every 4 hours, contact your healthcare provider or seek immediate medical attention. If prescribed an antibiotic or prednisone, take all of the medicine as prescribed, even if you are feeling better after a few days.    Don't smoke. Avoid being exposed to the smoke of others.    Some people with asthma have worsening of their symptoms when they take aspirin and non-steroidal or fever-reducing medicines like ibuprofen and naproxen. Talk to your healthcare provider if you think this may apply to you.  Follow-up care  Follow up with your healthcare provider, or as advised. Always bring all of your current medicines to any appointments with your healthcare provider. Also bring a complete list of medicines even those not taken for asthma. If you don't already have one, talk to your healthcare provider about developing your own \"Asthma " "Action Plan.\"  A pneumococcal (pneumonia) vaccine and yearly flu shot (every fall) are recommended. Ask your doctor about this.  When to seek medical advice  Call your healthcare provider right away if any of these occur:     Increased wheezing or shortness of breath    Need to use your inhalers more often than usual without relief    Fever of 100.4 F (38 C) or higher, or as directed by your healthcare provider    Coughing up lots of dark-colored or bloody sputum (mucus)    Chest pain with each breath    If you use a peak flow meter as part of an Asthma Action Plan, and you are still in the yellow zone (50% to 80%) 15 minutes after using inhaler medicine.  Call 911  Call 911 if any of the following occur    Trouble walking or talking because of shortness of breath    If you use a peak flow meter as part of an Asthma Action Plan and you are still in the red zone (less than 50%) 15 minutes after using inhaler medicine    Lips or fingernails turning gray or blue  Date Last Reviewed: 5/1/2017 2000-2019 [employer]. [address] [place] [address]. All rights reserved. This information is not intended as a substitute for professional medical care. Always follow your healthcare professional's instructions.         Patient Education     Coronavirus Disease 2019 (COVID-19): Caring for Yourself or Others   If you or a household member have symptoms of COVID-19, follow the guidelines below. This will help you manage symptoms and keep the virus from spreading.  If you have symptoms of COVID-19    Stay home and contact your care team. They will tell you what to do    Don t panic. Keep in mind that other illnesses can cause similar symptoms.    Stay away from work, school, and public places.    Limit physical contact with others in your home. Limit visitors. No kissing.    Clean surfaces you touch with disinfectant.    If you need to cough or sneeze, do it into a tissue. Then, throw the tissue into the " trash. If you don't have tissues, cough or sneeze into the bend of your elbow.    Don t share food or personal items with people in your household. This includes items like eating and drinking utensils, towels, and bedding.    Wear a cloth face mask around other people. It should cover your nose and mouth. You may need to make your own mask using a bandana, T-shirt, or other cloth. See the CDC s instructions on how to make a mask.    If you need to go to a hospital or clinic, call ahead to let them know. Expect the care team to wear masks, gowns, gloves, and eye protection. You may be put in a separate room.    Follow all instructions from your care team.    If you have been diagnosed with COVID-19    Stay home and away from others, including other people in your home. (This is called self-isolation.) Don t leave home unless you need to get medical care. Don't go to work, school, or public places. Don't use buses, taxis, or other public transportation.    Follow all instructions from your care team.    If you need to go to a hospital or clinic, call ahead to let them know. Expect the care team to wear masks, gowns, gloves, and eye protection. You may be put in a separate room.    Wear a face mask over your nose and mouth. This is to protect others from your germs. If you can t wear a mask, your caregivers should wear one. You may need to make your own mask using a bandana, T-shirt, or other cloth. See the CDC s instructions on how to make a mask.    Have no contact with pets and other animals.    Don t share food or personal items with people in your household. This includes items like eating and drinking utensils, towels, and bedding.    Don t share food or personal items with people in your household. This includes items like eating and drinking utensils, towels, and bedding.    If you need to cough or sneeze, do it into a tissue. Then, throw the tissue into the trash. If you don't have tissues, cough or sneeze  into the bend of your elbow.    Wash your hands often.    Self-care at home   At this time, there is no medicine approved to prevent or treat COVID-19. Experts are testing different medicines, trying to find one that works.  So far, the most proven treatment is to support your body while it fights the virus.    Get extra rest.    Drink extra fluids (6 to 8 glasses of liquids each day), unless a doctor has told you not to. Ask your care team which fluids are best for you. Avoid fluids that contain caffeine or alcohol.    Taking over-the-counter (OTC) pain medicine to reduce pain and fever. Your care team will tell you which medicine to use.  If you ve been in the hospital for COVID-19, follow your care team s instructions. They will tell you when to stop self-isolation. They may also have you change positions to help your breathing (such as lying on your belly).  If a test showed that you have COVID-19, you may be asked to donate plasma after you ve recovered. (This is called COVID-19 convalescent plasma donation.) The plasma may contain antibodies to help fight the virus in others. Scientists are testing whether this might be a treatment in the future. For more information, talk to your care team.  Caring for a sick person     Follow all instructions from the care team.    Wash your hands often.    Wear protective clothing as advised.    Make sure the sick person wears a mask. If they can't wear a mask, don't stay in the same room with them. If you must be in the same room, wear a face mask. Make sure the mask covers both the nose and mouth.    Keep track of the sick person s symptoms.    Clean surfaces often with disinfectant. This includes phones, kitchen counters, fridge door handles, bathroom surfaces, and others.    Don t let anyone share household items with the sick person. This includes eating and drinking tools, towels, sheets, and blankets.    Clean fabrics and laundry well.    Keep other people and pets  away from the sick person.    When you can stop self-isolation  When you are sick with COVID-19, you should stay away from other people. This is called self-isolation. The rules for ending self-isolation depend on your health in general.  If you are normally healthy  You can stop self-isolation when all 3 of these are true:  1. You ve had no fever--and no medicine that reduces fever--for at least 24 hours. And   2. Your symptoms are better, such as cough or trouble breathing. And   3. At least 10 days have passed since your symptoms first started.  Talk with your care team before you leave home. They may tell you it s okay to leave, or they may give you different advice.  If you have a weak immune system  If you re being treated for cancer, have an immune disorder (such as HIV), or have had a transplant (organ or bone marrow), follow your care team s instructions. You may be able to end self-isolation when all 3 of these are true:  1. You have no fever without fever-reducing medicine. And   2. Your symptoms are better, such as cough or trouble breathing. And   3. You ve had 2 tests for COVID-19. The tests happened at least 24 hours apart, and both show that you don t have the virus. (If no tests are available, your care team may tell you to follow the rules for normally healthy people above.)  When you return to public settings  When you are well enough to go outside your home, follow the CDC s guidance on cloth face masks.    Anyone over age 2 should wear a face mask in public, especially when it's hard to socially distance. This includes public transit, public protests and marches, and crowded stores, bars, and restaurants.    Face masks are most likely to reduce the spread of COVID-19 when they are widely used by people who are out in the public.  Certain people should not wear a face covering. These include:     Children under 2 years old    Anyone with a health, developmental, or mental health condition that  can be made worse by wearing a mask    Anyone who is unconscious or unable to remove the face covering without help. See the CDC's guidance on who should not wear a face mask.  When to call your care team  Call your care team right away if a sick person has any of these:    Trouble breathing    Pain or pressure in chest  If a sick person has any of these, call 911:    Trouble breathing that gets worse    Pain or pressure in chest that gets worse    Blue tint to lips or face    Fast or irregular heartbeat    Confusion or trouble waking    Fainting or loss of consciousness    Coughing up blood  Going home from the hospital   If you have COVID-19 and were recently in the hospital:    Follow the instructions above for self-care and isolation.    Follow the hospital care team s instructions.    Ask questions if anything is unclear to you. Write down answers so you remember them.  Date last modified: 8/12/2020  StayWell last reviewed this educational content on 4/1/2020 2000-2020 [employer] [address] [place] [address], [place] [address]. All rights reserved. This information is not intended as a substitute for professional medical care. Always follow your healthcare professional's instructions.

## 2020-10-14 NOTE — PROGRESS NOTES
"Faibola Tubbs is a 25 year old female who is being evaluated via a billable telephone visit.      The patient has been notified of following:     \"This telephone visit will be conducted via a call between you and your physician/provider. We have found that certain health care needs can be provided without the need for a physical exam.  This service lets us provide the care you need with a short phone conversation.  If a prescription is necessary we can send it directly to your pharmacy.  If lab work is needed we can place an order for that and you can then stop by our lab to have the test done at a later time.    Telephone visits are billed at different rates depending on your insurance coverage. During this emergency period, for some insurers they may be billed the same as an in-person visit.  Please reach out to your insurance provider with any questions.    If during the course of the call the physician/provider feels a telephone visit is not appropriate, you will not be charged for this service.\"    Patient has given verbal consent for Telephone visit?  Yes    What phone number would you like to be contacted at? [phone]    How would you like to obtain your AVS? Mail a copy    Subjective     Fabiola Tubbs is a 25 year old female who presents via phone visit today for the following health issues:    HPI     Concern - Asthma  Onset: chronic but worsening in the past few months.   Description: Family Hx of asthma. Her siblings and mother.   Heavy breathing/chest tightness, wheezing. Symptoms wake her up during her sleep. Infrequent cough  Intensity:  Progression of Symptoms:  worsening  Precipitating factors:        Worsened by: activities/running, walking up 3 flights of stairs to her apartment, seasonal allergies, cold air, humidity. With a cold she wheezes more. Mask makes it harder to breath.   Therapies tried and outcome: started taking her friends inhaler(albuetrol) For the past week or two. Helps her out a " guillermo.  Michele shower.     Works at a [employer] center. She was talking with her boss about her symptoms and they told her she was high risk to get covid19 if she has asthma. They will be moving her to a private room to do her job. She was exposed to a co-worker yesterday who had covid-19. She denies body aches and fevers.      Symptoms have been intermittent for years but never lasted this long. She would get it about 3 times per year now she is getting it about every other month. The only change has been she has had to wear a mask now with the pandemic. She has been using the inhaler 1-2 puffs TID which has really been helping. She does not have any animals. Denies recently moving, shes lived in her apartment for awhile now. Her seasonal allergies have been okay lately.     She is requesting a letter be sent to her email: [email or web address] stating what was discussed today/plan.       Review of Systems   Constitutional, HEENT, cardiovascular, gi and gu systems are negative, except chest tightness, wheezing, cough        Objective          Vitals:  No vitals were obtained today due to virtual visit.    healthy, alert and no distress  PSYCH: Alert and oriented times 3; coherent speech, normal   rate and volume, able to articulate logical thoughts, able   to abstract reason, no tangential thoughts, no hallucinations   or delusions  Her affect is normal  RESP: No cough, no audible wheezing, able to talk in full sentences, she denies SOB while on the phone   Remainder of exam unable to be completed due to telephone visits        Assessment/Plan:    Assessment & Plan     Chest tightness  Symptoms sound to be chronic intermittent and worsening recently. Will prescribe albuterol inhaler since this has been helpful and order PFTs. No recent fevers or body aches. Discussed covid-19 testing with symptoms and recent exposure, pt declined at this time. If symptoms worsen she should be evaluated in the ED   - albuterol (PROAIR  HFA/PROVENTIL HFA/VENTOLIN HFA) 108 (90 Base) MCG/ACT inhaler; Inhale 1-2 puffs into the lungs every 4 hours as needed for shortness of breath / dyspnea or wheezing  - General PFT Lab (Please always keep checked); Future  - Pulmonary Function Test; Future    Wheezing  See above   - albuterol (PROAIR HFA/PROVENTIL HFA/VENTOLIN HFA) 108 (90 Base) MCG/ACT inhaler; Inhale 1-2 puffs into the lungs every 4 hours as needed for shortness of breath / dyspnea or wheezing  - General PFT Lab (Please always keep checked); Future  - Pulmonary Function Test; Future    Family history of asthma  See above   - General PFT Lab (Please always keep checked); Future  - Pulmonary Function Test; Future          Return in about 2 weeks (around 10/28/2020) for If symptoms worsen or fail to improve.    Phyllis Rm NP  [facility]    Phone call duration:  14 minutes

## 2020-10-14 NOTE — LETTER
[facility]  [address] [place] [id number] [phone]    2020    Re: Fabiola Tubbs  [address] [facility] [address] [phone] (home)     : 1995      To Whom It May Concern:      Fabiola Tubbs was evaluated today. Further testing was ordered to evaluate for possible asthma. Inhaler was prescribed to be used as needed.       Sincerely,        Phyllis Rm NP